# Patient Record
Sex: FEMALE | Race: WHITE | NOT HISPANIC OR LATINO | Employment: OTHER | URBAN - METROPOLITAN AREA
[De-identification: names, ages, dates, MRNs, and addresses within clinical notes are randomized per-mention and may not be internally consistent; named-entity substitution may affect disease eponyms.]

---

## 2020-12-04 ENCOUNTER — TRANSCRIBE ORDERS (OUTPATIENT)
Dept: PHYSICAL THERAPY | Facility: CLINIC | Age: 80
End: 2020-12-04

## 2020-12-04 DIAGNOSIS — R42 DIZZINESS AND GIDDINESS: Primary | ICD-10-CM

## 2020-12-09 ENCOUNTER — TREATMENT (OUTPATIENT)
Dept: PHYSICAL THERAPY | Facility: CLINIC | Age: 80
End: 2020-12-09

## 2020-12-09 DIAGNOSIS — R26.89 IMPAIRMENT OF BALANCE: Primary | ICD-10-CM

## 2020-12-09 PROCEDURE — 97162 PT EVAL MOD COMPLEX 30 MIN: CPT | Performed by: PHYSICAL THERAPIST

## 2020-12-09 NOTE — PROGRESS NOTES
Physical Therapy Initial Evaluation and Plan of Care      Patient: Melissa De Jesus   : 1940  Diagnosis/ICD-10 Code:  Impairment of balance [R26.89]  Referring practitioner: Mary Faye MD  Date of Initial Visit: 2020  Today's Date: 2020  Patient seen for 1 sessions      Subjective:     Subjective Questionnaire: DIAZ 41/56  FGA 1630  TUG 15.62 sec  10 Meter 10.59 sec      Subjective Evaluation    History of Present Illness  Mechanism of injury: Patient relates last year she had a fall where she hit her head. Her MD sent her to the ER and all scans were clear. She also states that she has been diagnosed with neuropathy and has had sensation deficits in BLE's. She has tried gabapentin but hated the side effects. She also relates she has had B TKR. Pt states she cannot walk on altered surfaces and it is challenging to step over bathtub. Pt lives with her daughter in a 2 story house. She relates no problems walking up steps. She feels like she is weaving when she is walking. She refuses to use a cane offered by her daughter. Pt denies dizziness. She wishes to improve her balance and walking with therapy.     Quality of life: good    Pain  Current pain ratin  At best pain ratin  At worst pain ratin  Location: B shoulders     Treatments  Previous treatment: physical therapy  Patient Goals  Patient goals for therapy: improved balance and increased strength             Objective          Strength/Myotome Testing     Left Hip   Planes of Motion   Flexion: 4  Extension: 3  Abduction: 4    Right Hip   Planes of Motion   Flexion: 4  Extension: 3  Abduction: 4    Left Knee   Flexion: 4+  Extension: 4+    Right Knee   Flexion: 4+  Extension: 4+    Left Ankle/Foot   Dorsiflexion: 4    Right Ankle/Foot   Dorsiflexion: 4    Ambulation     Observational Gait     Additional Observational Gait Details  Ambulates with a normalized gait, however sway is noted with slower speed.         PT Neuro          Assessment & Plan     Assessment  Impairments: abnormal coordination, abnormal gait, activity intolerance, impaired balance, impaired physical strength and safety issue  Assessment details: Patient is an 80 YOF that was referred for balance retraining. Patient has been diagnosed with neuropathy with subsequent decline in proprioception, as well as global strength loss throughout BLE's. She will require skilled PT intervention for balance, gait and strength retraining in order to decrease risk of falls and improve mobility.   Prognosis: fair  Functional Limitations: carrying objects, walking, standing and stooping  Goals  Plan Goals: STG (6 visits)  1. Patient will report compliance with initial HEP.   2. Patient to improve DIAZ balance score to >/= 45 /56 to decrease client's risk of falls.  3. Patient to perform TUG within 13 sec without LOB for improved functional mobility.  4. Patient to ambulate 10 meters without AD within 9 sec without LOB for improved       gait nathan and functional mobility.  5. Patient to improve FGA score to >/= 19 /30 to decrease client's risk of falls.      LTG (12 visits)  1. Patient will be I with final HEP.   2. Patient to improve DIAZ balance score to >/= 50 /56 to decrease client's risk of falls.  3. Patient to perform TUG within 10 sec without LOB for improved functional mobility.  4. Patient to ambulate 10 meters without AD within 9 sec without LOB for improved gait nathan and functional mobility.  5. Patient to improve FGA score to >/= 22 /30 to decrease client's risk of falls.        Plan  Therapy options: will be seen for skilled physical therapy services  Planned modality interventions: TENS  Planned therapy interventions: ADL retraining, balance/weight-bearing training, flexibility, gait training, manual therapy, neuromuscular re-education, motor coordination training, postural training, strengthening, stretching, therapeutic activities, transfer training and home  exercise program  Frequency: 1x week  Duration in visits: 12  Treatment plan discussed with: patient  Plan details: Patient will be seen 1x/wk x 12wks with treatment to include strengthening, stretching, manual therapy, neuromuscular re-education, balance, gait and endurance training.           Timed:  Manual Therapy:    0     mins  66514;  Therapeutic Exercise:    0     mins  28900;     Neuromuscular Caitie:    0    mins  07112;    Therapeutic Activity:     0     mins  43214;     Gait Trainin     mins  27709;     Electrical Stimulation:    0     mins  60415 ( );    Untimed:  Canalith Repositioning    0     mins 29535    Timed Treatment:   0   mins   Total Treatment:     45   mins    PT SIGNATURE: Luz Graff PT, DPT, MSCS, CDP  [place] License #[id number]  DATE TREATMENT INITIATED: 2020    Medicare Initial Certification Certification Period: 3/9/2021  I certify that the therapy services are furnished while this patient is under my care.  The services outlined above are required by this patient, and will be reviewed every 90 days.     PHYSICIAN: Mary Faye MD      DATE:     Please sign and return via fax to [phone].   Thank you,   [facility] Physical Therapy.

## 2020-12-16 ENCOUNTER — TREATMENT (OUTPATIENT)
Dept: PHYSICAL THERAPY | Facility: CLINIC | Age: 80
End: 2020-12-16

## 2020-12-16 DIAGNOSIS — R26.89 IMPAIRMENT OF BALANCE: Primary | ICD-10-CM

## 2020-12-16 PROCEDURE — 97112 NEUROMUSCULAR REEDUCATION: CPT | Performed by: PHYSICAL THERAPIST

## 2020-12-16 PROCEDURE — 97110 THERAPEUTIC EXERCISES: CPT | Performed by: PHYSICAL THERAPIST

## 2020-12-17 NOTE — PROGRESS NOTES
Physical Therapy Daily Progress Note  Visit: 2  Date of Initial Visit: Type: THERAPY  Noted: 2020    Patient: Melissa De Jesus   : 1940  Diagnosis/ICD-10 Code:  Impairment of balance [R26.89]  Referring practitioner: Mary Faye MD  Date of Initial Visit: Type: THERAPY  Noted: 2020  Today's Date: 2020  Patient seen for 2 sessions      Subjective:   Patient reports: she has been practicing her exercises at home.   Pain: 0/10  Clinical Progress: improved  Home Program Compliance: Yes  Treatment has included: therapeutic exercise and neuromuscular re-education    Objective   See Exercise, Manual, and Modality Logs for complete treatment.    PT Neuro   Exercise 1  Exercise Name 1: Nu-Step   Equipment/Resistance 1: L5  Time: 6 min   Exercise 2  Exercise Name 2: forward lunges onto BOSU   Sets/Reps 2: 20  Exercise 3  Exercise Name 3: lateral lunges onto BOSU   Sets/Reps 3: 20  Exercise 4  Exercise Name 4: lateral stepping off of airex   Sets/Reps 4: 20  Exercise 5  Exercise Name 5: static stance balance exercises - feet together eyes closed, horizontal head turns and vertical head turns             e       Assessment & Plan     Assessment  Assessment details: Patient tolerated treatment well with minimal LOB and infrequent need to use UE's. Her static stance balance exercises for HEP were updated and progressed to include eyes closed and activation of vestibular system.     Plan  Plan details: Patient to continue with PT services to improve gait, balance, strength, transfers and overall functional mobility.          Timed:  Manual Therapy:            0     mins  55909;  Therapeutic Exercise:    8    mins  98644;     Neuromuscular Caitie:    30    mins  97562;    Therapeutic Activity:      0     mins  78212;     Gait Trainin    mins  54200;     Electrical Stimulation:    0    mins  81933 ( );     Untimed:  Canalith Repositioning techniques _0_ 01592      Timed Treatment:    38   mins   Total Treatment:     38   mins      Luz Graff, PT, DPT, MSCS, CDP  [place] License #: [id number]  Physical Therapist

## 2020-12-31 ENCOUNTER — TREATMENT (OUTPATIENT)
Dept: PHYSICAL THERAPY | Facility: CLINIC | Age: 80
End: 2020-12-31

## 2020-12-31 DIAGNOSIS — R26.89 IMPAIRMENT OF BALANCE: Primary | ICD-10-CM

## 2020-12-31 PROCEDURE — 97112 NEUROMUSCULAR REEDUCATION: CPT | Performed by: PHYSICAL THERAPIST

## 2020-12-31 PROCEDURE — 97110 THERAPEUTIC EXERCISES: CPT | Performed by: PHYSICAL THERAPIST

## 2020-12-31 NOTE — PROGRESS NOTES
Physical Therapy Daily Progress Note  Visit: 3  Date of Initial Visit: Type: THERAPY  Noted: 2020    Patient: Melissa De Jesus   : 1940  Diagnosis/ICD-10 Code:  Impairment of balance [R26.89]  Referring practitioner: Mary Faye MD  Date of Initial Visit: Type: THERAPY  Noted: 2020  Today's Date: 2020  Patient seen for 3 sessions      Subjective:   Patient reports: she has been doing her exercises.   Pain: 0/10  Clinical Progress: improved  Home Program Compliance: Yes  Treatment has included: therapeutic exercise and neuromuscular re-education    Objective   See Exercise, Manual, and Modality Logs for complete treatment.    PT Neuro          Assessment & Plan     Assessment  Assessment details: Patient demonstrates incorrect exercises that she had been administered from previous therapy. These were corrected and she was reprinted added exercises. Patient performed all exercises well without LOB. She will continue to be progressed as indicated.     Plan  Plan details: Patient to continue with PT services to improve gait, balance, strength, transfers and overall functional mobility.          Timed:  Manual Therapy:            0     mins  83292;  Therapeutic Exercise:    10    mins  30277;     Neuromuscular Caitie:    35    mins  25168;    Therapeutic Activity:      0     mins  56815;     Gait Trainin    mins  49901;     Electrical Stimulation:    0    mins  69538 ( );     Untimed:  Canalith Repositioning techniques _0_ 06596      Timed Treatment:   45   mins   Total Treatment:     45   mins      Luz Graff PT, DPT, MSCS, CDP  [place] License #: [id number]  Physical Therapist

## 2021-06-09 ENCOUNTER — TRANSCRIBE ORDERS (OUTPATIENT)
Dept: PHYSICAL THERAPY | Facility: CLINIC | Age: 81
End: 2021-06-09

## 2021-06-09 ENCOUNTER — TREATMENT (OUTPATIENT)
Dept: PHYSICAL THERAPY | Facility: CLINIC | Age: 81
End: 2021-06-09

## 2021-06-09 DIAGNOSIS — R42 DIZZINESS AND GIDDINESS: Primary | ICD-10-CM

## 2021-06-09 DIAGNOSIS — Z74.09 IMPAIRED FUNCTIONAL MOBILITY, BALANCE, GAIT, AND ENDURANCE: Primary | ICD-10-CM

## 2021-06-09 DIAGNOSIS — R26.89 BALANCE PROBLEM: ICD-10-CM

## 2021-06-09 PROCEDURE — 97162 PT EVAL MOD COMPLEX 30 MIN: CPT | Performed by: PHYSICAL THERAPIST

## 2021-06-09 NOTE — PROGRESS NOTES
Physical Therapy Initial Evaluation and Plan of Care      Patient: Melissa De Jesus   : 1940  Diagnosis/ICD-10 Code:  Impaired functional mobility, balance, gait, and endurance [Z74.09]  Referring practitioner: Mary Faye MD  Date of Initial Visit: 2021  Today's Date: 2021  Patient seen for 1 sessions      Subjective:     Subjective Questionnaire: MiniBESTest 15/28  FGA   TUG 13.56 sec  25 Ft Walk 10.46 sec normal speed and 6.97 sec fast speed      Subjective Evaluation    History of Present Illness  Mechanism of injury: Patient reports she stumbles while she walks to the left and right for a couple years now. Reports she has tingling in her feet when she stands up in the mornings. Quick turns and bending over make her feel as if she's losing her balance. Reports no falls. Pt states she eats well and takes care of herself.     Social Support  Lives in: multiple-level home             Objective          Strength/Myotome Testing     Left Hip   Planes of Motion   Flexion: 4+  Extension: 4  Abduction: 4    Right Hip   Planes of Motion   Flexion: 4+  Extension: 4  Abduction: 4-    Left Knee   Flexion: 4+  Extension: 4+    Right Knee   Flexion: 4+  Extension: 4+    Left Ankle/Foot   Dorsiflexion: 4-    Right Ankle/Foot   Dorsiflexion: 4-    Ambulation     Ambulation: Stairs   Pattern: reciprocal  Railings: one rail    Additional Stairs Ambulation Details  Pt ambulates with small step length and minimal trunk rotation or arm swing        PT Neuro         Assessment & Plan     Assessment  Impairments: impaired balance and safety issue  Assessment details: Pt is an 80 Y.O. female who presents to PT with complaints of LOB following diagnosis of peripheral neuropathy. Pt demonstrated impairments of turning, head turns, and narrow ADALBERTO in addition to reactive balance. Pt will require skilled physical therapy in order to improve her strength, endurance, balance, and coordination to improve her  functional mobility.  Prognosis: good  Functional Limitations: carrying objects and walking  Goals  Plan Goals: ST Goals (4 weeks)  1. Pt will be compliant with initial HEP.  2. Pt will improve TUG score to <12 sec in order to improve her functional mobility.  3. Pt will improve her MiniBESTest score to >18/28 to improve her static balance.  4. Pt will improve her FGA score to >20/30 to allow her to feel more comfortable while walking and decrease her risk of falls.     LT Goals (8 weeks)  1. Pt will be independent with HEP.  2. Pt will improve TUG score to <11 sec in order to improve her functional mobility.  3. Pt will improve her MiniBESTest score to >21/28 to improve her balance.  4. Pt will improve her FGA score to >22/30 to allow her to feel more comfortable while walking and decrease her risk of falls.     Plan  Therapy options: will be seen for skilled physical therapy services  Planned modality interventions: cryotherapy  Planned therapy interventions: abdominal trunk stabilization, balance/weight-bearing training, body mechanics training, flexibility, functional ROM exercises, gait training, home exercise program, manual therapy, neuromuscular re-education, strengthening, stretching and therapeutic activities  Frequency: 1x week  Duration in visits: 8  Plan details: Pt will be seen 1x/wk for 8wks with treatment to include balance, endurance, strengthening, and coordination to improve her functional mobility.           Timed:  Manual Therapy:    0     mins  10916;  Therapeutic Exercise:    0     mins  74267;     Neuromuscular Caitie:    0    mins  88790;    Therapeutic Activity:     0     mins  68340;     Gait Trainin     mins  23654;     Electrical Stimulation:    0     mins  76578 ( );    Untimed:  Canalith Repositioning    0     mins 75728    Timed Treatment:   0   mins   Total Treatment:     40   mins    PT SIGNATURE: Luz Graff, PT, DPT, MSCS, CDP  [place] License #[id number]  DATE TREATMENT  INITIATED: 6/9/2021    Medicare Initial Certification Certification Period: 9/7/2021  I certify that the therapy services are furnished while this patient is under my care.  The services outlined above are required by this patient, and will be reviewed every 90 days.     PHYSICIAN: Mary Faye MD      DATE:     Please sign and return via fax to [phone].   Thank you,   [facility] Physical Therapy.

## 2021-06-15 ENCOUNTER — TREATMENT (OUTPATIENT)
Dept: PHYSICAL THERAPY | Facility: CLINIC | Age: 81
End: 2021-06-15

## 2021-06-15 DIAGNOSIS — R26.89 BALANCE PROBLEM: ICD-10-CM

## 2021-06-15 DIAGNOSIS — Z74.09 IMPAIRED FUNCTIONAL MOBILITY, BALANCE, GAIT, AND ENDURANCE: Primary | ICD-10-CM

## 2021-06-15 DIAGNOSIS — R26.89 IMPAIRMENT OF BALANCE: ICD-10-CM

## 2021-06-15 PROCEDURE — 97112 NEUROMUSCULAR REEDUCATION: CPT | Performed by: PHYSICAL THERAPIST

## 2021-06-15 PROCEDURE — 97110 THERAPEUTIC EXERCISES: CPT | Performed by: PHYSICAL THERAPIST

## 2021-06-16 NOTE — PROGRESS NOTES
Physical Therapy Daily Progress Note  Visit: 2  Date of Initial Visit: Type: THERAPY  Noted: 2021    Patient: Melissa De Jesus   : 1940  Diagnosis/ICD-10 Code:  Impaired functional mobility, balance, gait, and endurance [Z74.09]  Referring practitioner: Mary Faye MD  Date of Initial Visit: Type: THERAPY  Noted: 2021  Today's Date: 2021  Patient seen for 2 sessions      Subjective:   Patient reports: Pt is apologetic about being late. Her daughter was caught at work.   Pain: 0/10  Clinical Progress: unchanged  Home Program Compliance: Yes  Treatment has included: therapeutic exercise and neuromuscular re-education    Objective   See Exercise, Manual, and Modality Logs for complete treatment.    PT Neuro          Assessment & Plan     Assessment  Assessment details: Patient demonstrates good balance with exercises, however when vision is removed, it does become significantly more challenging. Patient was instructed to continue exercises as prescribed, focusing on removal of vision at home.     Plan  Plan details: Patient to continue with PT services to improve gait, balance, strength, transfers and overall functional mobility.          Timed:  Manual Therapy:            0     mins  76702;  Therapeutic Exercise:    8    mins  73588;     Neuromuscular Caitie:    22    mins  47060;    Therapeutic Activity:      0     mins  17207;     Gait Trainin    mins  48819;     Electrical Stimulation:    0    mins  50253 ( );     Untimed:  Canalith Repositioning techniques _0_ 26382      Timed Treatment:   30   mins   Total Treatment:     30   mins      Luz Graff, PT, DPT, MSCS, CDP  [place] License #: [id number]  Physical Therapist

## 2021-06-30 ENCOUNTER — TREATMENT (OUTPATIENT)
Dept: PHYSICAL THERAPY | Facility: CLINIC | Age: 81
End: 2021-06-30

## 2021-06-30 DIAGNOSIS — R26.89 BALANCE PROBLEM: ICD-10-CM

## 2021-06-30 DIAGNOSIS — Z74.09 IMPAIRED FUNCTIONAL MOBILITY, BALANCE, GAIT, AND ENDURANCE: Primary | ICD-10-CM

## 2021-06-30 PROCEDURE — 97112 NEUROMUSCULAR REEDUCATION: CPT | Performed by: PHYSICAL THERAPIST

## 2021-06-30 PROCEDURE — 97110 THERAPEUTIC EXERCISES: CPT | Performed by: PHYSICAL THERAPIST

## 2021-07-08 ENCOUNTER — TREATMENT (OUTPATIENT)
Dept: PHYSICAL THERAPY | Facility: CLINIC | Age: 81
End: 2021-07-08

## 2021-07-08 DIAGNOSIS — R26.89 BALANCE PROBLEM: Primary | ICD-10-CM

## 2021-07-08 DIAGNOSIS — Z74.09 IMPAIRED FUNCTIONAL MOBILITY, BALANCE, GAIT, AND ENDURANCE: ICD-10-CM

## 2021-07-08 PROCEDURE — 97110 THERAPEUTIC EXERCISES: CPT | Performed by: PHYSICAL THERAPIST

## 2021-07-08 PROCEDURE — 97112 NEUROMUSCULAR REEDUCATION: CPT | Performed by: PHYSICAL THERAPIST

## 2021-07-08 NOTE — PROGRESS NOTES
PT Re-Assessment / Re-Certification  Visit: 4  Date of Initial Visit: Type: THERAPY  Noted: 2021    Patient: Melissa De Jesus   : 1940  Diagnosis/ICD-10 Code:  Balance problem [R26.89]  Referring practitioner: Mary Faye MD  Date of Initial Visit: Type: THERAPY  Noted: 2021  Today's Date: 2021  Patient seen for 4 sessions      Subjective:   Patient reports: she is feeling much better this week compared to last week since she has been using her eye drops 2 times per day  Pain: 0/10  Clinical Progress: improved  Home Program Compliance: Yes  Treatment has included: therapeutic exercise and neuromuscular re-education    Objective   See Exercise, Manual, and Modality Logs for complete treatment.    PT Neuro     TUG: 10.61 sec  25ft walk: 7.54 sec  FGA: 21  MiniBESTest: 21/28    Assessment & Plan     Assessment  Impairments: impaired balance and safety issue  Assessment details: Patient has made improvements in her gait speed, static and dynamic balance, and LE strength. She continues to have difficulty walking with a narrow ADALBERTO but has made improvement. She will require continued skilled physical therapy to further improve her strength, balance, and gait for overall improved functional mobility.   Prognosis: good  Functional Limitations: carrying objects and walking  Goals  Plan Goals: ST Goals (4 weeks)  1. Pt will be compliant with initial HEP. MET  2. Pt will improve TUG score to <12 sec in order to improve her functional mobility. MET  3. Pt will improve her MiniBESTest score to >18/28 to improve her static balance. MET  4. Pt will improve her FGA score to >20/30 to allow her to feel more comfortable while walking and decrease her risk of falls. MET    LT Goals (8 weeks)  1. Pt will be independent with HEP. ONGOING  2. Pt will improve TUG score to <11 sec in order to improve her functional mobility. MET  3. Pt will improve her MiniBESTest score to >21/28 to improve her balance. ONGOING  4.  Pt will improve her FGA score to >22/30 to allow her to feel more comfortable while walking and decrease her risk of falls. ONGOING    Plan  Therapy options: will be seen for skilled physical therapy services  Planned modality interventions: cryotherapy  Planned therapy interventions: abdominal trunk stabilization, balance/weight-bearing training, body mechanics training, flexibility, functional ROM exercises, gait training, home exercise program, manual therapy, neuromuscular re-education, strengthening, stretching and therapeutic activities  Frequency: 1x week  Duration in visits: 4  Plan details: Pt will continue to be seen 1x/wk for 4wks with treatment to include balance, endurance, strengthening, and coordination to improve her functional mobility.           Visit Diagnoses:    ICD-10-CM ICD-9-CM   1. Balance problem  R26.89 781.99   2. Impaired functional mobility, balance, gait, and endurance  Z74.09 V49.89       Progress toward previous goals: Partially Met      Recommendations: Continue as planned  Timeframe: 1 month    Roc Aranda, Physical Therapist Student completed treatment under my direct supervision.     2021    PT Signature: Corinne Perkins, PT, DPT     Based upon review of the patient's progress and continued therapy plan, it is my medical opinion that Melissa De Jesus should continue physical therapy treatment at [facility]  [address] [place] [id number] [phone].    Signature: __________________________________  Mary Faye MD    Timed:  Manual Therapy:    0     mins  08522;  Therapeutic Exercise:    12     mins  21056;     Neuromuscular Caitie:    34    mins  67843;    Therapeutic Activity:     0     mins  87454;     Gait Trainin     mins  10119;     Electrical Stimulation:    0     mins  74013 ( );    Untimed:  Canalith Repositioning   0 mins  80807    Timed Treatment:   46   mins   Total Treatment:     46    mins

## 2021-07-15 ENCOUNTER — TREATMENT (OUTPATIENT)
Dept: PHYSICAL THERAPY | Facility: CLINIC | Age: 81
End: 2021-07-15

## 2021-07-15 DIAGNOSIS — Z74.09 IMPAIRED FUNCTIONAL MOBILITY, BALANCE, GAIT, AND ENDURANCE: ICD-10-CM

## 2021-07-15 DIAGNOSIS — R26.89 BALANCE PROBLEM: Primary | ICD-10-CM

## 2021-07-15 PROCEDURE — 97110 THERAPEUTIC EXERCISES: CPT | Performed by: PHYSICAL THERAPIST

## 2021-07-15 PROCEDURE — 97112 NEUROMUSCULAR REEDUCATION: CPT | Performed by: PHYSICAL THERAPIST

## 2021-07-15 NOTE — PROGRESS NOTES
Physical Therapy Daily Progress Note  Visit: 5  Date of Initial Visit: Type: THERAPY  Noted: 2021    Patient: Melissa De Jesus   : 1940  Diagnosis/ICD-10 Code:  Balance problem [R26.89]  Referring practitioner: Mary Faye MD  Date of Initial Visit: Type: THERAPY  Noted: 2021  Today's Date: 7/15/2021  Patient seen for 5 sessions      Subjective:   Patient reports: she is feeling good and that's all that matters  Pain: 0/10  Clinical Progress: improved  Home Program Compliance: Yes  Treatment has included: therapeutic exercise and neuromuscular re-education    Objective   See Exercise, Manual, and Modality Logs for complete treatment.    PT Neuro          Assessment & Plan     Assessment  Assessment details: Patient demonstrated improved static and dynamic balance with advanced exercises. Pt continues to require occasional UE support at the beginning of exercises but improves with practice and generally does not rely on UE support by the end.     Plan  Plan details: Pt will continue with PT services for strengthening, balance, endurance, and coordination.        Timed:  Manual Therapy:            0     mins  78153;  Therapeutic Exercise:    12    mins  83566;     Neuromuscular Caitie:    26    mins  17840;    Therapeutic Activity:      0     mins  30928;     Gait Trainin    mins  21585;     Electrical Stimulation:    0    mins  76591 ( );     Untimed:  Canalith Repositioning techniques _0_ 67992      Timed Treatment:   38   mins   Total Treatment:     38   mins    Roc Aranda Physical Therapist Student completed treatment under my direct supervision.     07/15/2021    Luz Graff, PT, DPT, MSCS, CDP  [place] License #: [id number]  Physical Therapist

## 2021-07-22 ENCOUNTER — TREATMENT (OUTPATIENT)
Dept: PHYSICAL THERAPY | Facility: CLINIC | Age: 81
End: 2021-07-22

## 2021-07-22 PROCEDURE — 97110 THERAPEUTIC EXERCISES: CPT | Performed by: PHYSICAL THERAPIST

## 2021-07-22 PROCEDURE — 97112 NEUROMUSCULAR REEDUCATION: CPT | Performed by: PHYSICAL THERAPIST

## 2021-07-22 PROCEDURE — 97116 GAIT TRAINING THERAPY: CPT | Performed by: PHYSICAL THERAPIST

## 2021-07-22 NOTE — PROGRESS NOTES
Physical Therapy Daily Progress Note  Visit: 6  Date of Initial Visit: Type: THERAPY  Noted: 2021    Patient: Melissa De Jesus   : 1940  Diagnosis/ICD-10 Code:  Peripheral neuropathy   Referring practitioner: Mary Faye MD  Date of Initial Visit: Type: THERAPY  Noted: 2021  Today's Date: 2021  Patient seen for 6 sessions      Subjective:   Patient reports: she is very well today  Pain: 0/10  Clinical Progress: improved  Home Program Compliance: Yes  Treatment has included: therapeutic exercise, neuromuscular re-education and gait training    Objective   See Exercise, Manual, and Modality Logs for complete treatment.    PT Neuro          Assessment & Plan     Assessment  Assessment details: Patient had good tolerance to stepping over and onto objects, but demonstrated difficulty stepping on TD's with slight LOB. After repetition, pt was able to maintain her balance on TD's and progress to tandem stance and cone taps. Pt will be progressed as she tolerates.     Plan  Plan details: Pt will continue with PT services for strengthening, balance, endurance, and gait.         Timed:  Manual Therapy:            0     mins  90543;  Therapeutic Exercise:    8    mins  28819;     Neuromuscular Caitie:    18    mins  47874;    Therapeutic Activity:      0     mins  27225;     Gait Trainin    mins  42105;     Electrical Stimulation:    0    mins  42351 ( );     Untimed:  Canalith Repositioning techniques _0_ 41058      Timed Treatment:   40   mins   Total Treatment:     40   mins    Roc Aranda Physical Therapist Student completed treatment under my direct supervision.     2021  Luz Graff, PT, DPT, MSCS, CDP  [place] License #: [id number]  Physical Therapist

## 2021-07-29 ENCOUNTER — TREATMENT (OUTPATIENT)
Dept: PHYSICAL THERAPY | Facility: CLINIC | Age: 81
End: 2021-07-29

## 2021-07-29 DIAGNOSIS — Z74.09 IMPAIRED FUNCTIONAL MOBILITY, BALANCE, GAIT, AND ENDURANCE: Primary | ICD-10-CM

## 2021-07-29 DIAGNOSIS — R26.89 BALANCE PROBLEM: ICD-10-CM

## 2021-07-29 PROCEDURE — 97112 NEUROMUSCULAR REEDUCATION: CPT | Performed by: PHYSICAL THERAPIST

## 2021-07-29 PROCEDURE — 97110 THERAPEUTIC EXERCISES: CPT | Performed by: PHYSICAL THERAPIST

## 2021-07-29 NOTE — PROGRESS NOTES
Physical Therapy Daily Progress Note/Discharge Summary  Visit: 7  Date of Initial Visit: Type: THERAPY  Noted: 2021    Patient: Melissa De Jesus   : 1940  Diagnosis/ICD-10 Code:  Impaired functional mobility, balance, gait, and endurance [Z74.09]  Referring practitioner: Mary Faye MD  Date of Initial Visit: Type: THERAPY  Noted: 2021  Today's Date: 2021  Patient seen for 7 sessions      Subjective:   Patient reports: she is feeling very tired today because she did not sleep well last night  Pain: 0/10  Clinical Progress: improved  Home Program Compliance: Yes  Treatment has included: therapeutic exercise and neuromuscular re-education    Objective   See Exercise, Manual, and Modality Logs for complete treatment.    PT Neuro          Assessment & Plan     Assessment  Assessment details: Patient has demonstrated improvement in her LE strength, static and dynamic balance, and endurance. Pt has met the majority of her goals and demonstrates independence with her HEP. Pt was provided with ways to progress her exercises and will be discharged.     Goals  Plan Goals: ST Goals (4 weeks)  1. Pt will be compliant with initial HEP. MET  2. Pt will improve TUG score to <12 sec in order to improve her functional mobility. MET  3. Pt will improve her MiniBESTest score to >18/28 to improve her static balance. MET  4. Pt will improve her FGA score to >20/30 to allow her to feel more comfortable while walking and decrease her risk of falls. MET    LT Goals (8 weeks)  1. Pt will be independent with HEP. MET  2. Pt will improve TUG score to <11 sec in order to improve her functional mobility. MET  3. Pt will improve her MiniBESTest score to >21/28 to improve her balance. NOT MET  4. Pt will improve her FGA score to >22/30 to allow her to feel more comfortable while walking and decrease her risk of falls. NOT MET    Plan  Plan details: Pt will be discharged at this time.         Timed:  Manual Therapy:             0     mins  21762;  Therapeutic Exercise:    25    mins  04194;     Neuromuscular Caitie:    13    mins  07798;    Therapeutic Activity:      0     mins  00468;     Gait Trainin    mins  94347;     Electrical Stimulation:    0    mins  23928 ( );     Untimed:  Canalith Repositioning techniques _0_ 40469      Timed Treatment:   38   mins   Total Treatment:     38   mins    Roc Aranda Physical Therapist Student completed treatment under my direct supervision.     2021  Luz Graff PT, DPT, MSCS, CDP  [place] License #: [id number]  Physical Therapist

## 2022-07-06 ENCOUNTER — TRANSCRIBE ORDERS (OUTPATIENT)
Dept: PHYSICAL THERAPY | Facility: CLINIC | Age: 82
End: 2022-07-06

## 2022-07-06 DIAGNOSIS — R26.9 ABNORMAL GAIT: Primary | ICD-10-CM

## 2022-08-03 ENCOUNTER — TREATMENT (OUTPATIENT)
Dept: PHYSICAL THERAPY | Facility: CLINIC | Age: 82
End: 2022-08-03

## 2022-08-03 DIAGNOSIS — R26.89 BALANCE PROBLEM: ICD-10-CM

## 2022-08-03 DIAGNOSIS — Z74.09 IMPAIRED FUNCTIONAL MOBILITY, BALANCE, GAIT, AND ENDURANCE: Primary | ICD-10-CM

## 2022-08-03 PROCEDURE — 97110 THERAPEUTIC EXERCISES: CPT | Performed by: PHYSICAL THERAPIST

## 2022-08-03 PROCEDURE — 97161 PT EVAL LOW COMPLEX 20 MIN: CPT | Performed by: PHYSICAL THERAPIST

## 2022-08-03 NOTE — PROGRESS NOTES
Physical Therapy Initial Evaluation and Plan of Care      Patient: Melissa De Jesus   : 1940  Diagnosis/ICD-10 Code:  Impaired functional mobility, balance, gait, and endurance [Z74.09]  Referring practitioner: Mary Faye MD  Date of Initial Visit: 8/3/2022  Today's Date: 8/3/2022  Patient seen for 1 sessions           Subjective Evaluation    History of Present Illness  Mechanism of injury: Patient reports that she is returning to PT because of her abnormal gait and decreased balance. She does not use an AD and does not want to start using one if she does not have to. She walks daily, but has noticed unsteadiness. She is nervous when walking downstairs because fear of falling. She wears orthopedic shoes with arch support, which has helped her significantly pronated feet.    Quality of life: good    Pain  No pain reported    Social Support  Lives in: multiple-level home    Treatments  Previous treatment: physical therapy  Patient Goals  Patient goals for therapy: improved balance, increased strength and increased motion             Objective          Static Posture     Knee   Genu valgus.     Ankle/Foot   Ankle/Foot (Left): Pronated.   Ankle/Foot (Right): Pronated.     Strength/Myotome Testing     Left Hip   Planes of Motion   Flexion: 3  Extension: 2+  Abduction: 2+    Right Hip   Planes of Motion   Flexion: 3  Extension: 2  Abduction: 2    Left Knee   Flexion: 4  Extension: 4    Right Knee   Flexion: 4-  Extension: 4-    Left Ankle/Foot   Dorsiflexion: 3-    Right Ankle/Foot   Dorsiflexion: 3-    Left Hip Flexibility Comments:   Prone quad stretch: tight     Right Hip Flexibility Comments:   Prone quad stretch: tight           Assessment & Plan     Assessment  Impairments: abnormal gait, activity intolerance, impaired balance, impaired physical strength and lacks appropriate home exercise program  Functional Limitations: lifting, walking, pulling, pushing, standing and stooping  Assessment details:  Patient is an 81 y.o female presenting in-clinic with impairments related to gait, balance, functional strength, and overall global mobility due to LE weakness and peripheral neuropathy effects. She demonstrates bilateral knee valgus due to weakness and difficulty with sitting to standing. She is an appropriate candidate to receive skilled PT due to deficits mentioned.   Prognosis: good    Goals  Plan Goals: ST Goals (4 weeks). Pt will be able to:   1. Be compliant with initial HEP.  2. Improve TUG score to <10 sec in order to improve her functional mobility.  3. Improve MiniBESTest score to >20/28 to improve her static and reactive balance.  4. Improve her FGA score to >23/30 to allow her to feel more comfortable while walking and decrease her risk of falls.     LT Goals (8 weeks) Pt will be able to:  1. Independent with final HEP.  2. Improve TUG score to <8 sec in order to improve her functional mobility.  3. Improve MiniBESTest score to >24/28 to improve her balance and reduce risk of falls.  4. Improve FGA score to >27/30 to allow her to feel more comfortable while walking and decrease her risk of falls.     Plan  Therapy options: will be seen for skilled therapy services  Planned modality interventions: TENS, cryotherapy and thermotherapy (hydrocollator packs)  Planned therapy interventions: abdominal trunk stabilization, balance/weight-bearing training, body mechanics training, flexibility, functional ROM exercises, gait training, home exercise program, transfer training, therapeutic activities, stretching, strengthening, postural training, motor coordination training and neuromuscular re-education  Frequency: 1x week  Duration in visits: 8  Treatment plan discussed with: patient  Plan details: Pt will be seen 1x/wk for 8wks with treatment to include balance, endurance, strengthening, and coordination to improve her functional mobility.        Timed:  Manual Therapy:                 mins  38560;  Therapeutic  Exercise:    12    mins  26215;     Neuromuscular Caitie:        mins  10051;    Therapeutic Activity:           mins  65323;     Gait Training:                     mins  13261;     Electrical Stimulation:        mins  86802 ( );     Untimed:  Canalith Repositioning techniques __ [id number]      Timed Treatment:   12   mins   Total Treatment:     45   mins      Star Judd Physical Therapy Student      I was present in the PT Department guiding the student by approving, concurring, and confirming the skilled judgement for all services rendered.     PT: Luz Graff, PT     License Number: [id number]  Electronically signed by Star Judd, PT Student, 08/03/22, 3:45 PM EDT    Medicare Initial Certification  Certification Period: 11/1/2022  I certify that the therapy services are furnished while this patient is under my care.  The services outlined above are required by this patient, and will be reviewed every 90 days.     PHYSICIAN: _______________________________________________________  Mary Faye MD        DATE: ____________________________________________________________    Please sign and return via fax to [phone].. Thank you, [facility].

## 2022-08-11 ENCOUNTER — TREATMENT (OUTPATIENT)
Dept: PHYSICAL THERAPY | Facility: CLINIC | Age: 82
End: 2022-08-11

## 2022-08-11 DIAGNOSIS — Z74.09 IMPAIRED FUNCTIONAL MOBILITY, BALANCE, GAIT, AND ENDURANCE: ICD-10-CM

## 2022-08-11 DIAGNOSIS — R26.89 BALANCE PROBLEM: Primary | ICD-10-CM

## 2022-08-11 PROCEDURE — 97110 THERAPEUTIC EXERCISES: CPT | Performed by: PHYSICAL THERAPIST

## 2022-08-11 PROCEDURE — 97112 NEUROMUSCULAR REEDUCATION: CPT | Performed by: PHYSICAL THERAPIST

## 2022-08-11 NOTE — PROGRESS NOTES
Physical Therapy Daily Note  Visit: 2  Date of Initial Visit: Type: THERAPY  Noted: 8/3/2022    Patient: Melissa De Jesus   : 1940  Diagnosis/ICD-10 Code:  Balance problem [R26.89]  Referring practitioner: Mary Faye MD  Date of Initial Visit: Type: THERAPY  Noted: 8/3/2022  Today's Date: 2022  Patient seen for 2 sessions      Subjective:   Patient reports: her glutes are sore from the exercises given last session (muscular soreness).  Pain: 0/10  Clinical Progress: unchanged  Home Program Compliance: Yes  Treatment has included: therapeutic exercise and neuromuscular re-education    Objective   See Exercise, Manual, and Modality Logs for complete treatment.    PT Neuro     TU.74 sec  10 M: 11.12 sec  FGA:   DIAZ/56    Assessment & Plan     Assessment    Assessment details: Patient demonstrates difficulty with tasks eliminating vision, stepping backwards, and moving head. She tends to cross midline with retro-stepping causing increased LOB most likely due to hip/glute weakness. Updated her HEP this session to challenge visual system further.     Plan  Plan details: Patient to continue with PT treatment to include balance, endurance, strengthening, and coordination to improve her functional mobility.        Timed:  Manual Therapy:                 mins  93625;  Therapeutic Exercise:    8    mins  13573;     Neuromuscular Caitie:    32    mins  20612;    Therapeutic Activity:           mins  34051;     Gait Training:                     mins  99119;     Electrical Stimulation:        mins  26172 ( );     Untimed:  Canalith Repositioning techniques __ 64757      Timed Treatment:   40   mins   Total Treatment:     40   mins      Star Judd PT student     I was present in the PT Department guiding the student by approving, concurring, and confirming the skilled judgement for all services rendered.     Luz Graff, PT  [place] License: [id number]

## 2022-08-18 ENCOUNTER — TREATMENT (OUTPATIENT)
Dept: PHYSICAL THERAPY | Facility: CLINIC | Age: 82
End: 2022-08-18

## 2022-08-18 DIAGNOSIS — Z74.09 IMPAIRED FUNCTIONAL MOBILITY, BALANCE, GAIT, AND ENDURANCE: ICD-10-CM

## 2022-08-18 DIAGNOSIS — R26.89 BALANCE PROBLEM: Primary | ICD-10-CM

## 2022-08-18 PROCEDURE — 97110 THERAPEUTIC EXERCISES: CPT | Performed by: PHYSICAL THERAPIST

## 2022-08-18 PROCEDURE — 97112 NEUROMUSCULAR REEDUCATION: CPT | Performed by: PHYSICAL THERAPIST

## 2022-08-18 NOTE — PROGRESS NOTES
Physical Therapy Daily Note  Visit: 3  Date of Initial Visit: Type: THERAPY  Noted: 8/3/2022    Patient: Melissa De Jesus   : 1940  Diagnosis/ICD-10 Code:  Balance problem [R26.89]  Referring practitioner: Mary Faye MD  Date of Initial Visit: Type: THERAPY  Noted: 8/3/2022  Today's Date: 2022  Patient seen for 3 sessions      Subjective:   Patient reports: she has been nervous to perform backwards walking/stepping at home.   Pain: 0/10  Clinical Progress: unchanged  Home Program Compliance: Yes  Treatment has included: therapeutic exercise and neuromuscular re-education    Objective   See Exercise, Manual, and Modality Logs for complete treatment.    PT Neuro          Assessment & Plan     Assessment    Assessment details: Patient's LE fatigued very quickly in this session. She displayed difficulty with staying in slight knee flexion to target hip muscles when stepping due to weakness and poor motor control. STS were practiced with resistance band around knees for external cue because she exhibits knee valgus standing up and sitting down.     Plan  Plan details: Patient to continue with PT treatment to include balance, endurance, strengthening, and coordination to improve her functional mobility.        Timed:  Manual Therapy:                 mins  72189;  Therapeutic Exercise:    8    mins  00534;     Neuromuscular Caitie:    34    mins  01436;    Therapeutic Activity:           mins  77732;     Gait Training:                     mins  74057;     Electrical Stimulation:        mins  98671 ( );     Untimed:  Canalith Repositioning techniques __ 75390      Timed Treatment:   42   mins   Total Treatment:     42   mins      Star Judd PT student     I was present in the PT Department guiding the student by approving, concurring, and confirming the skilled judgement for all services rendered.     Luz Graff, PT  [place] License: [id number]

## 2022-08-25 ENCOUNTER — TELEPHONE (OUTPATIENT)
Dept: PHYSICAL THERAPY | Facility: CLINIC | Age: 82
End: 2022-08-25

## 2022-09-08 ENCOUNTER — TREATMENT (OUTPATIENT)
Dept: PHYSICAL THERAPY | Facility: CLINIC | Age: 82
End: 2022-09-08

## 2022-09-08 DIAGNOSIS — R26.89 BALANCE PROBLEM: Primary | ICD-10-CM

## 2022-09-08 DIAGNOSIS — Z74.09 IMPAIRED FUNCTIONAL MOBILITY, BALANCE, GAIT, AND ENDURANCE: ICD-10-CM

## 2022-09-08 PROCEDURE — 97112 NEUROMUSCULAR REEDUCATION: CPT | Performed by: PHYSICAL THERAPIST

## 2022-09-08 PROCEDURE — 97110 THERAPEUTIC EXERCISES: CPT | Performed by: PHYSICAL THERAPIST

## 2022-09-09 NOTE — PROGRESS NOTES
PT Progress Note  Visit: 4  Date of Initial Visit: Type: THERAPY  Noted: 8/3/2022    Patient: Melissa De Jesus   : 1940  Diagnosis/ICD-10 Code:  Balance problem [R26.89]  Referring practitioner: Mary Faye MD  Date of Initial Visit: Type: THERAPY  Noted: 8/3/2022  Today's Date: 2022  Patient seen for 4 sessions      Subjective:   Patient reports: she has been gone for 2 weeks because her daughter had covid.   Pain: 0/10  Clinical Progress: improved  Home Program Compliance: Yes  Treatment has included: therapeutic exercise and neuromuscular re-education    Objective   See Exercise, Manual, and Modality Logs for complete treatment.    PT Neuro         Assessment & Plan     Assessment  Impairments: abnormal gait, activity intolerance, impaired balance, impaired physical strength and lacks appropriate home exercise program  Functional Limitations: lifting, walking, pulling, pushing, standing and stooping  Assessment details: Patient has not been in clinic x 2 weeks due to daughters illness. She has not performed her HEP as prescribed during these 2 weeks due to low motivation per pt report. She is going to start going to the [employer] and potentially hire a . She will require ongoing skilled PT intervention to address strength and balance deficits.   Prognosis: good    Goals  Plan Goals: ST Goals (4 weeks). Pt will be able to:   1. Be compliant with initial HEP. MET  2. Improve TUG score to <10 sec in order to improve her functional mobility. ONGOING  3. Improve MiniBESTest score to >20/28 to improve her static and reactive balance.ONGOING  4. Improve her FGA score to >23/30 to allow her to feel more comfortable while walking and decrease her risk of falls. ONGOING    LT Goals (8 weeks) Pt will be able to:  1. Independent with final HEP.ONGOING  2. Improve TUG score to <8 sec in order to improve her functional mobility.ONGOING  3. Improve MiniBESTest score to >24/28 to  improve her balance and reduce risk of falls.ONGOING  4. Improve FGA score to >27/30 to allow her to feel more comfortable while walking and decrease her risk of falls. ONGOING    Plan  Therapy options: will be seen for skilled therapy services  Planned modality interventions: TENS, cryotherapy and thermotherapy (hydrocollator packs)  Planned therapy interventions: abdominal trunk stabilization, balance/weight-bearing training, body mechanics training, flexibility, functional ROM exercises, gait training, home exercise program, transfer training, therapeutic activities, stretching, strengthening, postural training, motor coordination training and neuromuscular re-education  Frequency: 1x week  Duration in visits: 6  Treatment plan discussed with: patient  Plan details: Pt will continue to be seen 1x/wk for 6wks with treatment to include balance, endurance, strengthening, and coordination to improve her functional mobility.        Visit Diagnoses:    ICD-10-CM ICD-9-CM   1. Balance problem  R26.89 781.99   2. Impaired functional mobility, balance, gait, and endurance  Z74.09 V49.89       Progress toward previous goals: Partially Met      Recommendations: Continue as planned  Timeframe: 6 weeks    PT Signature: Luz Graff, PT, DPT, MSCS, CDP, CSRS  [place] License #: [id number]    Based upon review of the patient's progress and continued therapy plan, it is my medical opinion that Melissa De Jesus should continue physical therapy treatment at [facility] PHYSICAL THERAPY  [facility] [address] [facility] [id number] [phone].    Signature: __________________________________  Mary Faye MD    Timed:  Manual Therapy:    0     mins  20701;  Therapeutic Exercise:    15     mins  90946;     Neuromuscular Caitie:    30    mins  81000;    Therapeutic Activity:     0     mins  73438;     Gait Trainin     mins  48686;     Electrical Stimulation:    0     mins  08402 (  );    Untimed:  Canalith Repositioning   0 mins  55613    Timed Treatment:   45   mins   Total Treatment:     45   mins

## 2022-09-15 ENCOUNTER — TREATMENT (OUTPATIENT)
Dept: PHYSICAL THERAPY | Facility: CLINIC | Age: 82
End: 2022-09-15

## 2022-09-15 DIAGNOSIS — Z74.09 IMPAIRED FUNCTIONAL MOBILITY, BALANCE, GAIT, AND ENDURANCE: ICD-10-CM

## 2022-09-15 DIAGNOSIS — R26.89 BALANCE PROBLEM: Primary | ICD-10-CM

## 2022-09-15 PROCEDURE — 97112 NEUROMUSCULAR REEDUCATION: CPT | Performed by: PHYSICAL THERAPIST

## 2022-09-15 PROCEDURE — 97110 THERAPEUTIC EXERCISES: CPT | Performed by: PHYSICAL THERAPIST

## 2022-09-16 NOTE — PROGRESS NOTES
Physical Therapy Daily Note  Visit: 5  Date of Initial Visit: Type: THERAPY  Noted: 8/3/2022    Patient: Melissa De Jesus   : 1940  Diagnosis/ICD-10 Code:  Balance problem [R26.89]  Referring practitioner: Mary Faye MD  Date of Initial Visit: Type: THERAPY  Noted: 8/3/2022  Today's Date: 2022  Patient seen for 5 sessions      Subjective:   Patient reports: she is feeling good.   Pain: 0/10  Clinical Progress: improved  Home Program Compliance: Yes  Treatment has included: therapeutic exercise and neuromuscular re-education    Objective   See Exercise, Manual, and Modality Logs for complete treatment.    PT Neuro          Assessment & Plan     Assessment    Assessment details: Patient demonstrates improved balance and movement this week. She will continue to be progressed with strength in order to achieve balance goals. She will also have a  upon discharge from PT.     Plan  Plan details: Patient to continue with PT services to improve gait, balance, strength, transfers and overall functional mobility.          Timed:  Manual Therapy:            0     mins  51788;  Therapeutic Exercise:    30    mins  52556;     Neuromuscular Caitie:    15    mins  32865;    Therapeutic Activity:      0     mins  69415;     Gait Trainin    mins  43741;     Electrical Stimulation:    0    mins  54133 ( );     Untimed:  Canalith Repositioning techniques _0_ 85195      Timed Treatment:   45   mins   Total Treatment:     45   mins      Luz Graff PT, DPT, MSCS, CDP, CSRS  [place] License #: [id number]  Physical Therapist

## 2022-09-22 ENCOUNTER — TREATMENT (OUTPATIENT)
Dept: PHYSICAL THERAPY | Facility: CLINIC | Age: 82
End: 2022-09-22

## 2022-09-22 DIAGNOSIS — Z74.09 IMPAIRED FUNCTIONAL MOBILITY, BALANCE, GAIT, AND ENDURANCE: ICD-10-CM

## 2022-09-22 DIAGNOSIS — R26.89 BALANCE PROBLEM: Primary | ICD-10-CM

## 2022-09-22 DIAGNOSIS — R26.89 IMPAIRMENT OF BALANCE: ICD-10-CM

## 2022-09-22 PROCEDURE — 97110 THERAPEUTIC EXERCISES: CPT | Performed by: PHYSICAL THERAPIST

## 2022-09-22 PROCEDURE — 97112 NEUROMUSCULAR REEDUCATION: CPT | Performed by: PHYSICAL THERAPIST

## 2022-09-23 NOTE — PROGRESS NOTES
Physical Therapy Daily Note  Visit: 6  Date of Initial Visit: Type: THERAPY  Noted: 8/3/2022    Patient: Melissa De Jesus   : 1940  Diagnosis/ICD-10 Code:  Balance problem [R26.89]  Referring practitioner: Mary Faye MD  Date of Initial Visit: Type: THERAPY  Noted: 8/3/2022  Today's Date: 2022  Patient seen for 6 sessions      Subjective:   Patient reports: she is feeling 82!   Pain: 0/10  Clinical Progress: improved  Home Program Compliance: Yes  Treatment has included: therapeutic exercise and neuromuscular re-education    Objective   See Exercise, Manual, and Modality Logs for complete treatment.    PT Neuro          Assessment & Plan     Assessment    Assessment details: Patient demonstrates good balance with exercises however she did require UE assist. Patient will continue with exercises at home, as well as go to the [facility] center in order to work with a .     Plan  Plan details: Patient to continue with PT services to improve gait, balance, strength, transfers and overall functional mobility.          Timed:  Manual Therapy:            0     mins  63955;  Therapeutic Exercise:    10    mins  50456;     Neuromuscular Caitie:    30    mins  89935;    Therapeutic Activity:      0     mins  72268;     Gait Trainin    mins  45290;     Electrical Stimulation:    0    mins  77427 ( );     Untimed:  Canalith Repositioning techniques _0_ 40063      Timed Treatment:   40   mins   Total Treatment:     40   mins      Luz Graff PT, DPT, MSCS, CDP, CSRS  [place] License #: [id number]  Physical Therapist

## 2022-09-29 ENCOUNTER — TREATMENT (OUTPATIENT)
Dept: PHYSICAL THERAPY | Facility: CLINIC | Age: 82
End: 2022-09-29

## 2022-09-29 DIAGNOSIS — Z74.09 IMPAIRED FUNCTIONAL MOBILITY, BALANCE, GAIT, AND ENDURANCE: ICD-10-CM

## 2022-09-29 DIAGNOSIS — R26.89 BALANCE PROBLEM: Primary | ICD-10-CM

## 2022-09-29 PROCEDURE — 97110 THERAPEUTIC EXERCISES: CPT | Performed by: PHYSICAL THERAPIST

## 2022-09-29 PROCEDURE — 97112 NEUROMUSCULAR REEDUCATION: CPT | Performed by: PHYSICAL THERAPIST

## 2022-09-30 NOTE — PROGRESS NOTES
Physical Therapy Daily Note/Discharge Summary  Visit: 7  Date of Initial Visit: Type: THERAPY  Noted: 8/3/2022    Patient: Melissa De Jesus   : 1940  Diagnosis/ICD-10 Code:  Balance problem [R26.89]  Referring practitioner: Mary Faye MD  Date of Initial Visit: Type: THERAPY  Noted: 8/3/2022  Today's Date: 2022  Patient seen for 7 sessions    Subjective:   Patient reports: she is feeling less than 78 today.   Pain: 0/10  Clinical Progress: improved  Home Program Compliance: Yes  Treatment has included: therapeutic exercise and neuromuscular re-education    Objective   See Exercise, Manual, and Modality Logs for complete treatment.    PT Neuro          Assessment & Plan     Assessment    Assessment details: Patient has made good improvements with global balance and stability. She continues with weakness in B hip musculature, along with quads and hamstring. She has complete HEP that she is independent with and will continue at the gym.     Plan  Plan details: Patient requests to discharge at this time to try personal training at her gym.         Timed:  Manual Therapy:            0     mins  60029;  Therapeutic Exercise:    30    mins  62371;     Neuromuscular Caitie:    10    mins  63616;    Therapeutic Activity:      0     mins  73128;     Gait Trainin    mins  83383;     Electrical Stimulation:    0    mins  98574 ( );     Untimed:  Canalith Repositioning techniques _0_ 15412      Timed Treatment:   40   mins   Total Treatment:     40   mins      Luz Graff PT, DPT, MSCS, CDP, CSRS  [place] License #: [id number]  Physical Therapist